# Patient Record
Sex: FEMALE | Race: WHITE | Employment: FULL TIME | URBAN - NONMETROPOLITAN AREA
[De-identification: names, ages, dates, MRNs, and addresses within clinical notes are randomized per-mention and may not be internally consistent; named-entity substitution may affect disease eponyms.]

---

## 2024-01-30 ENCOUNTER — OFFICE VISIT (OUTPATIENT)
Dept: URGENT CARE | Facility: CLINIC | Age: 24
End: 2024-01-30
Payer: COMMERCIAL

## 2024-01-30 VITALS
HEIGHT: 62 IN | OXYGEN SATURATION: 98 % | BODY MASS INDEX: 23.28 KG/M2 | TEMPERATURE: 98.4 F | WEIGHT: 126.5 LBS | SYSTOLIC BLOOD PRESSURE: 108 MMHG | DIASTOLIC BLOOD PRESSURE: 75 MMHG | HEART RATE: 98 BPM | RESPIRATION RATE: 18 BRPM

## 2024-01-30 DIAGNOSIS — Z02.4 DRIVER'S PERMIT PHYSICAL EXAMINATION: Primary | ICD-10-CM

## 2024-01-30 NOTE — PROGRESS NOTES
[facility]        NAME: Aarti Aragon is a 23 y.o. female  : 2000    MRN: [id number]  DATE: 2024  TIME: 2:34 PM    Assessment and Plan   's permit physical examination [Z02.4]  1. 's permit physical examination          Normal  permit physical exam.    Patient Instructions       Chief Complaint     Chief Complaint   Patient presents with    Annual Exam     Drivers physical         History of Present Illness       23-year-old female presents the clinic for 's permit physical.  No pertinent past medical history, surgeries, daily medications, current symptoms.  No history of cardiovascular disease, diabetes, seizures.        Review of Systems   Review of Systems   Constitutional:  Negative for chills and fever.   HENT:  Negative for ear pain and sore throat.    Eyes:  Negative for pain and visual disturbance.   Respiratory:  Negative for cough and shortness of breath.    Cardiovascular:  Negative for chest pain and palpitations.   Gastrointestinal:  Negative for abdominal pain and vomiting.   Endocrine: Negative.    Genitourinary:  Negative for dysuria and hematuria.   Musculoskeletal:  Negative for arthralgias and back pain.   Skin:  Negative for color change and rash.   Neurological:  Negative for seizures and syncope.   Hematological: Negative.    Psychiatric/Behavioral: Negative.     All other systems reviewed and are negative.        Current Medications     No current outpatient medications on file.    Current Allergies     Allergies as of 2024    (No Known Allergies)            The following portions of the patient's history were reviewed and updated as appropriate: allergies, current medications, past family history, past medical history, past social history, past surgical history and problem list.     History reviewed. No pertinent past medical history.    History reviewed. No pertinent surgical history.    No family history on  "file.      Medications have been verified.        Objective   /75   Pulse 98   Temp 98.4 °F (36.9 °C)   Resp 18   Ht 5' 2\" (1.575 m)   Wt 57.4 kg (126 lb 8 oz)   SpO2 98%   BMI 23.14 kg/m²        Physical Exam     Physical Exam  Constitutional:       General: She is not in acute distress.     Appearance: Normal appearance. She is not ill-appearing, toxic-appearing or diaphoretic.   HENT:      Head: Normocephalic and atraumatic.      Right Ear: Tympanic membrane, ear canal and external ear normal.      Left Ear: Tympanic membrane, ear canal and external ear normal.      Nose: No congestion.      Mouth/Throat:      Mouth: Mucous membranes are moist.      Pharynx: Oropharynx is clear. No oropharyngeal exudate or posterior oropharyngeal erythema.   Eyes:      Extraocular Movements: Extraocular movements intact.      Conjunctiva/sclera: Conjunctivae normal.      Pupils: Pupils are equal, round, and reactive to light.   Cardiovascular:      Rate and Rhythm: Normal rate and regular rhythm.      Pulses: Normal pulses.      Heart sounds: Normal heart sounds. No murmur heard.     No friction rub. No gallop.   Pulmonary:      Effort: Pulmonary effort is normal. No respiratory distress.      Breath sounds: Normal breath sounds. No stridor. No wheezing, rhonchi or rales.   Chest:      Chest wall: No tenderness.   Abdominal:      General: Abdomen is flat. Bowel sounds are normal. There is no distension.      Palpations: Abdomen is soft. There is no mass.      Tenderness: There is no abdominal tenderness. There is no guarding or rebound.      Hernia: No hernia is present.   Musculoskeletal:         General: No swelling, tenderness or signs of injury. Normal range of motion.      Cervical back: Normal range of motion and neck supple. No tenderness.   Lymphadenopathy:      Cervical: No cervical adenopathy.   Skin:     General: Skin is warm and dry.      Capillary Refill: Capillary refill takes less than 2 seconds.      " Findings: No rash.   Neurological:      General: No focal deficit present.      Mental Status: She is alert and oriented to person, place, and time. Mental status is at baseline.   Psychiatric:         Mood and Affect: Mood normal.         Behavior: Behavior normal.         Thought Content: Thought content normal.         Judgment: Judgment normal.